# Patient Record
Sex: FEMALE | Race: WHITE
[De-identification: names, ages, dates, MRNs, and addresses within clinical notes are randomized per-mention and may not be internally consistent; named-entity substitution may affect disease eponyms.]

---

## 2018-11-25 ENCOUNTER — HOSPITAL ENCOUNTER (EMERGENCY)
Age: 33
Discharge: HOME | End: 2018-11-25

## 2018-11-25 ENCOUNTER — HOSPITAL ENCOUNTER (EMERGENCY)
Dept: HOSPITAL 91 - FTE | Age: 33
Discharge: HOME | End: 2018-11-25
Payer: COMMERCIAL

## 2018-11-25 DIAGNOSIS — R10.2: ICD-10-CM

## 2018-11-25 DIAGNOSIS — Z3A.10: ICD-10-CM

## 2018-11-25 DIAGNOSIS — O26.891: Primary | ICD-10-CM

## 2018-11-25 LAB
ADD MAN DIFF?: NO
ADD UMIC: NO
BASOPHIL #: 0.1 10^3/UL (ref 0–0.1)
BASOPHILS %: 0.5 % (ref 0–2)
EOSINOPHILS #: 0.2 10^3/UL (ref 0–0.5)
EOSINOPHILS %: 2.2 % (ref 0–7)
HEMATOCRIT: 40.5 % (ref 37–47)
HEMOGLOBIN: 13.5 G/DL (ref 12–16)
IMMATURE GRANS #M: 0.03 10^3/UL (ref 0–0.03)
IMMATURE GRANS % (M): 0.3 % (ref 0–0.43)
LYMPHOCYTES #: 2 10^3/UL (ref 0.8–2.9)
LYMPHOCYTES %: 20.1 % (ref 15–51)
MEAN CORPUSCULAR HEMOGLOBIN: 29.6 PG (ref 29–33)
MEAN CORPUSCULAR HGB CONC: 33.3 G/DL (ref 32–37)
MEAN CORPUSCULAR VOLUME: 88.8 FL (ref 82–101)
MEAN PLATELET VOLUME: 11.5 FL (ref 7.4–10.4)
MONOCYTE #: 0.7 10^3/UL (ref 0.3–0.9)
MONOCYTES %: 6.5 % (ref 0–11)
NEUTROPHIL #: 7.1 10^3/UL (ref 1.6–7.5)
NEUTROPHILS %: 70.4 % (ref 39–77)
NUCLEATED RED BLOOD CELLS #: 0 10^3/UL (ref 0–0)
NUCLEATED RED BLOOD CELLS%: 0 /100WBC (ref 0–0)
PLATELET COUNT: 272 10^3/UL (ref 140–415)
RED BLOOD COUNT: 4.56 10^6/UL (ref 4.2–5.4)
RED CELL DISTRIBUTION WIDTH: 12.1 % (ref 11.5–14.5)
UR ASCORBIC ACID: 40 MG/DL
UR BILIRUBIN (DIP): NEGATIVE MG/DL
UR BLOOD (DIP): NEGATIVE MG/DL
UR CLARITY: CLEAR
UR COLOR: YELLOW
UR GLUCOSE (DIP): NEGATIVE MG/DL
UR KETONES (DIP): (no result) MG/DL
UR LEUKOCYTE ESTERASE (DIP): NEGATIVE LEU/UL
UR NITRITE (DIP): NEGATIVE MG/DL
UR PH (DIP): 6 (ref 5–9)
UR SPECIFIC GRAVITY (DIP): 1.02 (ref 1–1.03)
UR TOTAL PROTEIN (DIP): NEGATIVE MG/DL
UR UROBILINOGEN (DIP): NEGATIVE MG/DL
WHITE BLOOD COUNT: 10 10^3/UL (ref 4.8–10.8)

## 2018-11-25 PROCEDURE — 81003 URINALYSIS AUTO W/O SCOPE: CPT

## 2018-11-25 PROCEDURE — 86900 BLOOD TYPING SEROLOGIC ABO: CPT

## 2018-11-25 PROCEDURE — 84702 CHORIONIC GONADOTROPIN TEST: CPT

## 2018-11-25 PROCEDURE — 96361 HYDRATE IV INFUSION ADD-ON: CPT

## 2018-11-25 PROCEDURE — 76801 OB US < 14 WKS SINGLE FETUS: CPT

## 2018-11-25 PROCEDURE — 86901 BLOOD TYPING SEROLOGIC RH(D): CPT

## 2018-11-25 PROCEDURE — 99285 EMERGENCY DEPT VISIT HI MDM: CPT

## 2018-11-25 PROCEDURE — 96374 THER/PROPH/DIAG INJ IV PUSH: CPT

## 2018-11-25 PROCEDURE — 76705 ECHO EXAM OF ABDOMEN: CPT

## 2018-11-25 PROCEDURE — 85025 COMPLETE CBC W/AUTO DIFF WBC: CPT

## 2018-11-25 PROCEDURE — 36415 COLL VENOUS BLD VENIPUNCTURE: CPT

## 2018-11-25 RX ADMIN — THIAMINE HYDROCHLORIDE 1 MLS/HR: 100 INJECTION, SOLUTION INTRAMUSCULAR; INTRAVENOUS at 14:52

## 2018-11-25 RX ADMIN — METOCLOPRAMIDE HYDROCHLORIDE 1 MG: 10 INJECTION, SOLUTION INTRAMUSCULAR; INTRAVENOUS at 17:03

## 2018-11-25 RX ADMIN — ACETAMINOPHEN 1 MG: 325 TABLET, FILM COATED ORAL at 14:51

## 2019-01-08 ENCOUNTER — HOSPITAL ENCOUNTER (EMERGENCY)
Dept: HOSPITAL 10 - FTE | Age: 34
Discharge: HOME | End: 2019-01-08
Payer: COMMERCIAL

## 2019-01-08 VITALS — RESPIRATION RATE: 18 BRPM | HEART RATE: 67 BPM | DIASTOLIC BLOOD PRESSURE: 53 MMHG | SYSTOLIC BLOOD PRESSURE: 103 MMHG

## 2019-01-08 VITALS — WEIGHT: 129.19 LBS | BODY MASS INDEX: 29.9 KG/M2 | HEIGHT: 55 IN

## 2019-01-08 DIAGNOSIS — O21.9: Primary | ICD-10-CM

## 2019-01-08 DIAGNOSIS — Z3A.16: ICD-10-CM

## 2019-01-08 PROCEDURE — 76805 OB US >/= 14 WKS SNGL FETUS: CPT

## 2019-01-08 PROCEDURE — 36415 COLL VENOUS BLD VENIPUNCTURE: CPT

## 2019-01-08 PROCEDURE — 96374 THER/PROPH/DIAG INJ IV PUSH: CPT

## 2019-01-08 PROCEDURE — 81001 URINALYSIS AUTO W/SCOPE: CPT

## 2019-01-08 PROCEDURE — 99285 EMERGENCY DEPT VISIT HI MDM: CPT

## 2019-01-08 PROCEDURE — 80053 COMPREHEN METABOLIC PANEL: CPT

## 2019-01-08 PROCEDURE — 85025 COMPLETE CBC W/AUTO DIFF WBC: CPT

## 2019-01-08 PROCEDURE — 83690 ASSAY OF LIPASE: CPT

## 2019-01-08 NOTE — ERD
ER Documentation


Chief Complaint


Chief Complaint





NAUSEA, VOMITING, SENT PER PMD FOR EVAL, 16 WKS PG, NO VB





HPI


33-year-old female presents with vomiting since early pregnancy.  She is 


approximately 16 weeks pregnant by dates.  She denies any bleeding.  She has 


intermittent crampy abdominal pain.  She denies any dysuria, fevers.  Vomit is 


nonbilious nonbloody.  She is a G1 para 0.  She was referred by primary doctor 


for evaluation for IV fluids.





ROS


All systems reviewed and are negative except as per history of present illness.





Medications


Home Meds


Active Scripts


Metoclopramide* (Reglan*) 10 Mg Tablet, 10 MG PO Q6 PRN for NAUSEA AND/OR 


VOMITING, #20 TAB


   Prov:JYOTI GONZALEZ MD         1/8/19


Metoclopramide* (Reglan*) 10 Mg Tablet, 10 MG PO Q6 PRN for NAUSEA AND/OR 


VOMITING, #10 TAB


   Prov:BERNICE HARPER PA-C         11/25/18


Acetaminophen* (Tylophen*) 500 Mg Capsule, 1 CAP PO Q6H PRN for PAIN AND OR 


ELEVATED TEMP, #20 CAP


   Prov:BERNICE HARPER PA-C         11/25/18





Allergies


Allergies:  


Coded Allergies:  


     No Known Allergy (Unverified , 11/25/18)





PMhx/Soc


Medical and Surgical Hx:  pt denies Medical Hx


History of Surgery:  Yes (Appendectomy)


Anesthesia Reaction:  No


Hx Alcohol Use:  No


Hx Substance Use:  No


Hx Tobacco Use:  No


Smoking Status:  Never smoker





FmHx


Family History:  No diabetes, No coronary disease, No other





Physical Exam


Vitals





Vital Signs


  Date      Temp  Pulse  Resp  B/P (MAP)   Pulse Ox  O2          O2 Flow    FiO2


Time                                                 Delivery    Rate


    1/8/19  98.0     79    16      110/55       100


     17:42                           (73)





Physical Exam


Const:   No acute distress


Head:   Atraumatic 


Eyes:    Normal Conjunctiva


ENT:    Normal External Ears, Nose and Mouth.


Neck:               Full range of motion. No meningismus.


Resp:   Clear to auscultation bilaterally


Cardio:   Regular rate and rhythm, no murmurs


Abd:    Soft, non tender, non distended. Normal bowel sounds


Skin:   No petechiae or rashes


Back:   No midline or flank tenderness


Ext:    No cyanosis, or edema


Neur:   Awake and alert


Psych:    Normal Mood and Affect


Result Diagram:  


1/8/19 1920 1/8/19 1920





Results 24 hrs





Laboratory Tests


      Test
                                  1/8/19
19:20     1/8/19
19:21


      White Blood Count                      8.9 10^3/ul


      Red Blood Count                       3.64 10^6/ul


      Hemoglobin                               11.1 g/dl


      Hematocrit                                  32.8 %


      Mean Corpuscular Volume                    90.1 fl


      Mean Corpuscular Hemoglobin                30.5 pg


      Mean Corpuscular Hemoglobin
Concent     33.8 g/dl 
  



      Red Cell Distribution Width                 12.9 %


      Platelet Count                         231 10^3/UL


      Mean Platelet Volume                       11.0 fl


      Immature Granulocytes %                    0.400 %


      Neutrophils %                               66.6 %


      Lymphocytes %                               21.8 %


      Monocytes %                                  6.8 %


      Eosinophils %                                4.1 %


      Basophils %                                  0.3 %


      Nucleated Red Blood Cells %            0.0 /100WBC


      Immature Granulocytes #              0.040 10^3/ul


      Neutrophils #                          5.9 10^3/ul


      Lymphocytes #                          2.0 10^3/ul


      Monocytes #                            0.6 10^3/ul


      Eosinophils #                          0.4 10^3/ul


      Basophils #                            0.0 10^3/ul


      Nucleated Red Blood Cells #            0.0 10^3/ul


      Sodium Level                            141 mmol/L


      Potassium Level                         3.2 mmol/L


      Chloride Level                          101 mmol/L


      Carbon Dioxide Level                     28 mmol/L


      Anion Gap                                       12


      Blood Urea Nitrogen                        7 mg/dl


      Creatinine                              0.46 mg/dl


      Est Glomerular Filtrat Rate
mL/min   > 60 mL/min 
   



      Glucose Level                            127 mg/dl


      Calcium Level                            9.4 mg/dl


      Total Bilirubin                          0.5 mg/dl


      Direct Bilirubin                        0.00 mg/dl


      Indirect Bilirubin                       0.5 mg/dl


      Aspartate Amino Transf
(AST/SGOT)         21 IU/L 
  



      Alanine Aminotransferase
(ALT/SGPT)       26 IU/L 
  



      Alkaline Phosphatase                       36 IU/L


      Total Protein                             7.0 g/dl


      Albumin                                   4.1 g/dl


      Globulin                                 2.90 g/dl


      Albumin/Globulin Ratio                        1.41


      Lipase                                      67 U/L


      Urine Color                                          YELLOW


      Urine Clarity
                       
               SLIGHTLY
CLOUDY


      Urine pH                                                        6.0


      Urine Specific Gravity                                        1.025


      Urine Ketones                                        TRACE mg/dL


      Urine Nitrite                                        NEGATIVE mg/dL


      Urine Bilirubin                                      NEGATIVE mg/dL


      Urine Urobilinogen                                   NEGATIVE mg/dL


      Urine Leukocyte Esterase
            
               NEGATIVE
Shamar/ul


      Urine Microscopic RBC                                        0 /HPF


      Urine Microscopic WBC                                        2 /HPF


      Urine Squamous Epithelial
Cells      
               MODERATE /HPF 



      Urine Amorphous Crystals                             FEW /HPF


      Urine Bacteria                                       FEW /HPF


      Urine Mucus                                          MODERATE /HPF


      Urine Hemoglobin                                     NEGATIVE mg/dL


      Urine Glucose                                              3+ mg/dL


      Urine Total Protein                                        1+ mg/dl





Current Medications


 Medications
   Dose
          Sig/Lorrie
       Start Time
   Status  Last


 (Trade)       Ordered        Route
 PRN     Stop Time              Admin
Dose


                              Reason                                Admin


 Sodium         2,000 ml @ 
   Q2H STAT
      1/8/19 1/8/19


Chloride       1,000 mls/hr   IV
            19:07
 1/8/19                19:31



                                             21:06


                10 mg          ONCE  ONCE
    1/8/19        DC            1/8/19


Metoclopramid                 IV
            19:30
 1/8/19                19:33



e HCl
                                       19:31


(Reglan)








Procedures/MDM


CBC shows minimal anemia.  Potassium is 3.2.  Urine shows no significant acute 


abnormal findings.  Patient was given 2 L normal saline IV IV, Reglan 10 mg IV. 


Patient had no vomiting during ER course.  She had a benign abdomen on serial 


exam.  Pelvic ultrasound shows second trimester intrauterine pregnancy without 


appreciable acute abnormalities.  Patient was given K-Dur 40 mg by mouth.  She 


presents with signs and symptoms of hyperemesis without evidence of significant 


dehydration, signs of complication of pregnancy, additional emergent causes of 


presenting complaints.  Will Reglan, recommendations for frequent small meals, 


fluids, primary care follow-up and return precautions for pain, bleeding, new or


worsening symptoms.  The patient was stable with no new complaints during the ER


course. Clinically, there is no current evidence to suggest meningitis, sepsis, 


acute abdomen, pneumonia, stroke,  acute coronary syndrome, pulmonary embolism, 


aortic dissection or any other emergent condition appearing to require further 


evaluation or hospitalization.  Patient counseled regarding my diagnostic 


impression and care plan. Prior to discharge all questions answered. Pt agrees 


with treatment plan and understands strict return precautions. Pt is instructed 


to follow up with primary care provider within 24-48 hours. Precautionary 


instructions provided including instructions to return to the ER if not 


improving or for any worsening or changing symptoms or concerns.





Departure


Diagnosis:  


   Primary Impression:  


   Nausea and vomiting


   Vomiting type:  unspecified  Vomiting Intractability:  unspecified  Qualified


   Codes:  R11.2 - Nausea with vomiting, unspecified


Condition:  Stable


Patient Instructions:  Hyperemesis Gravidarum (Severe Morning Sickness)


Referrals:  


DOCTOR,NOT ON STAFF (PCP)





Additional Instructions:  


[facility] NORMAL.  Cheque otro vez con pedroza doctor primario en el proximo 


mark or regresa para mas o nueva simptomas.











JYOTI GONZALEZ MD              Jan 8, 2019 20:05

## 2019-06-26 ENCOUNTER — HOSPITAL ENCOUNTER (INPATIENT)
Dept: HOSPITAL 91 - L-D | Age: 34
LOS: 4 days | Discharge: HOME | End: 2019-06-30
Payer: COMMERCIAL

## 2019-06-26 ENCOUNTER — HOSPITAL ENCOUNTER (INPATIENT)
Dept: HOSPITAL 10 - L-D | Age: 34
LOS: 4 days | Discharge: HOME | End: 2019-06-30
Attending: OBSTETRICS & GYNECOLOGY | Admitting: OBSTETRICS & GYNECOLOGY
Payer: COMMERCIAL

## 2019-06-26 VITALS
WEIGHT: 158.73 LBS | HEIGHT: 62 IN | BODY MASS INDEX: 29.21 KG/M2 | WEIGHT: 158.73 LBS | BODY MASS INDEX: 29.21 KG/M2 | HEIGHT: 62 IN

## 2019-06-26 VITALS — DIASTOLIC BLOOD PRESSURE: 65 MMHG | SYSTOLIC BLOOD PRESSURE: 109 MMHG | HEART RATE: 70 BPM | RESPIRATION RATE: 18 BRPM

## 2019-06-26 DIAGNOSIS — O36.63X0: ICD-10-CM

## 2019-06-26 DIAGNOSIS — O48.0: Primary | ICD-10-CM

## 2019-06-26 DIAGNOSIS — Z3A.40: ICD-10-CM

## 2019-06-26 LAB
ADD MAN DIFF?: NO
ADD UMIC: NO
BASOPHIL #: 0.1 10^3/UL (ref 0–0.1)
BASOPHILS %: 0.5 % (ref 0–2)
EOSINOPHILS #: 0.3 10^3/UL (ref 0–0.5)
EOSINOPHILS %: 2.5 % (ref 0–7)
HEMATOCRIT: 38 % (ref 37–47)
HEMOGLOBIN: 12.1 G/DL (ref 12–16)
HEPATITIS B SURFACE ANTIGEN: NEGATIVE
IMMATURE GRANS #M: 0.07 10^3/UL (ref 0–0.03)
IMMATURE GRANS % (M): 0.7 % (ref 0–0.43)
INR: 0.84
LYMPHOCYTES #: 1.6 10^3/UL (ref 0.8–2.9)
LYMPHOCYTES %: 15.2 % (ref 15–51)
MEAN CORPUSCULAR HEMOGLOBIN: 28.8 PG (ref 29–33)
MEAN CORPUSCULAR HGB CONC: 31.8 G/DL (ref 32–37)
MEAN CORPUSCULAR VOLUME: 90.5 FL (ref 82–101)
MEAN PLATELET VOLUME: 12.4 FL (ref 7.4–10.4)
MONOCYTE #: 0.9 10^3/UL (ref 0.3–0.9)
MONOCYTES %: 8.5 % (ref 0–11)
NEUTROPHIL #: 7.5 10^3/UL (ref 1.6–7.5)
NEUTROPHILS %: 72.6 % (ref 39–77)
NUCLEATED RED BLOOD CELLS #: 0 10^3/UL (ref 0–0)
NUCLEATED RED BLOOD CELLS%: 0 /100WBC (ref 0–0)
PARTIAL THROMBOPLASTIN TIME: 26.1 SEC (ref 23–35)
PLATELET COUNT: 187 10^3/UL (ref 140–415)
PROTIME: 11.6 SEC (ref 11.9–14.9)
PT RATIO: 0.9
RAPID PLASMA REAGIN: NONREACTIVE
RED BLOOD COUNT: 4.2 10^6/UL (ref 4.2–5.4)
RED CELL DISTRIBUTION WIDTH: 17.2 % (ref 11.5–14.5)
UR ASCORBIC ACID: NEGATIVE MG/DL
UR BILIRUBIN (DIP): NEGATIVE MG/DL
UR BLOOD (DIP): NEGATIVE MG/DL
UR CALCIUM OXALATE CRYSTAL: (no result) /HPF
UR CLARITY: (no result)
UR COLOR: YELLOW
UR GLUCOSE (DIP): NEGATIVE MG/DL
UR KETONES (DIP): (no result) MG/DL
UR LEUKOCYTE ESTERASE (DIP): NEGATIVE LEU/UL
UR MUCUS: (no result) /HPF
UR NITRITE (DIP): NEGATIVE MG/DL
UR PH (DIP): 5 (ref 5–9)
UR RBC: 1 /HPF (ref 0–5)
UR SPECIFIC GRAVITY (DIP): 1.02 (ref 1–1.03)
UR SQUAMOUS EPITHELIAL CELL: (no result) /HPF
UR TOTAL PROTEIN (DIP): NEGATIVE MG/DL
UR UROBILINOGEN (DIP): NEGATIVE MG/DL
UR WBC: 1 /HPF (ref 0–5)
WHITE BLOOD COUNT: 10.3 10^3/UL (ref 4.8–10.8)

## 2019-06-26 PROCEDURE — 86900 BLOOD TYPING SEROLOGIC ABO: CPT

## 2019-06-26 PROCEDURE — 62322 NJX INTERLAMINAR LMBR/SAC: CPT

## 2019-06-26 PROCEDURE — 86850 RBC ANTIBODY SCREEN: CPT

## 2019-06-26 PROCEDURE — 81003 URINALYSIS AUTO W/O SCOPE: CPT

## 2019-06-26 PROCEDURE — 87340 HEPATITIS B SURFACE AG IA: CPT

## 2019-06-26 PROCEDURE — 85610 PROTHROMBIN TIME: CPT

## 2019-06-26 PROCEDURE — 80048 BASIC METABOLIC PNL TOTAL CA: CPT

## 2019-06-26 PROCEDURE — 85730 THROMBOPLASTIN TIME PARTIAL: CPT

## 2019-06-26 PROCEDURE — 81001 URINALYSIS AUTO W/SCOPE: CPT

## 2019-06-26 PROCEDURE — 86901 BLOOD TYPING SEROLOGIC RH(D): CPT

## 2019-06-26 PROCEDURE — 85025 COMPLETE CBC W/AUTO DIFF WBC: CPT

## 2019-06-26 PROCEDURE — 86592 SYPHILIS TEST NON-TREP QUAL: CPT

## 2019-06-26 PROCEDURE — 76816 OB US FOLLOW-UP PER FETUS: CPT

## 2019-06-26 RX ADMIN — FENTANYL CIT 0.2 MG/100ML-ROPIV 0.2%-NACL 0.9% EPIDURAL INJ 1 ML: 2/0.2 SOLUTION at 18:35

## 2019-06-26 RX ADMIN — PYRIDOXINE HYDROCHLORIDE 1 MLS/HR: 100 INJECTION, SOLUTION INTRAMUSCULAR; INTRAVENOUS at 17:57

## 2019-06-26 RX ADMIN — Medication 1 MCG: at 10:45

## 2019-06-26 RX ADMIN — PYRIDOXINE HYDROCHLORIDE SCH MLS/HR: 100 INJECTION, SOLUTION INTRAMUSCULAR; INTRAVENOUS at 22:21

## 2019-06-26 RX ADMIN — Medication 1 MLS/HR: at 17:58

## 2019-06-26 RX ADMIN — PYRIDOXINE HYDROCHLORIDE 1 MLS/HR: 100 INJECTION, SOLUTION INTRAMUSCULAR; INTRAVENOUS at 17:01

## 2019-06-26 RX ADMIN — PYRIDOXINE HYDROCHLORIDE 1 MLS/HR: 100 INJECTION, SOLUTION INTRAMUSCULAR; INTRAVENOUS at 08:41

## 2019-06-26 RX ADMIN — PYRIDOXINE HYDROCHLORIDE 1 MLS/HR: 100 INJECTION, SOLUTION INTRAMUSCULAR; INTRAVENOUS at 12:49

## 2019-06-26 RX ADMIN — FENTANYL CIT 0.2 MG/100ML-ROPIV 0.2%-NACL 0.9% EPIDURAL INJ SCH ML: 2/0.2 SOLUTION at 18:35

## 2019-06-26 RX ADMIN — PYRIDOXINE HYDROCHLORIDE PRN MLS/HR: 100 INJECTION, SOLUTION INTRAMUSCULAR; INTRAVENOUS at 17:57

## 2019-06-26 RX ADMIN — PYRIDOXINE HYDROCHLORIDE SCH MLS/HR: 100 INJECTION, SOLUTION INTRAMUSCULAR; INTRAVENOUS at 12:49

## 2019-06-26 RX ADMIN — PYRIDOXINE HYDROCHLORIDE PRN MLS/HR: 100 INJECTION, SOLUTION INTRAMUSCULAR; INTRAVENOUS at 17:01

## 2019-06-26 RX ADMIN — PYRIDOXINE HYDROCHLORIDE 1 MLS/HR: 100 INJECTION, SOLUTION INTRAMUSCULAR; INTRAVENOUS at 22:21

## 2019-06-26 RX ADMIN — Medication SCH MLS/HR: at 17:58

## 2019-06-26 RX ADMIN — PYRIDOXINE HYDROCHLORIDE SCH MLS/HR: 100 INJECTION, SOLUTION INTRAMUSCULAR; INTRAVENOUS at 08:41

## 2019-06-26 NOTE — PAC
Date/Time of Note


Date/Time of Note


DATE: 6/26/19 


TIME: 19:36





Post-Anesthesia Notes


Post-Anesthesia Note


Last documented vital signs





Vital Signs


  Date      Temp  Pulse  Resp  B/P (MAP)   Pulse Ox  O2          O2 Flow    FiO2


Time                                                 Delivery    Rate


   6/26/19  97.8     70    18      109/65       100


     19:16                           (80)





Activity:  WNL


Respiratory function:  WNL


Cardiovascular function:  WNL


Mental status:  Baseline


Pain reasonably controlled:  Yes


Hydration appropriate:  Yes


Nausea/Vomiting absent:  Yes











HOPE VELOZ MD              Jun 26, 2019 19:37

## 2019-06-26 NOTE — PREAC
Date/Time of Note


Date/Time of Note


DATE: 19 


TIME: 17:57





Anesthesia Eval and Record


Evaluation


Time Pre-Procedure Interview


DATE: 19 


TIME: 17:57


Age


34


Sex


female


NPO:  8 hrs


Preoperative diagnosis


Labor Pain


Planned procedure


Labor Epidural





Past Medical History


Past Medical History:  Includes


Pregnancy:  : (1), Para: (0), Gestational age: (40)





Surgery & Anesthesia Issues


No known issue





Meds


Anticoagulation:  No


Beta Blocker within 24 hr:  No


Reason Beta Blocker not given:  Pt. not on B-Blocker


Active Scripts


Metoclopramide* (Reglan*) 10 Mg Tablet, 10 MG PO Q6 PRN for NAUSEA AND/OR 


VOMITING, #20 TAB


   Prov:JYOTI GONZALEZ MD         19


Metoclopramide* (Reglan*) 10 Mg Tablet, 10 MG PO Q6 PRN for NAUSEA AND/OR 


VOMITING, #10 TAB


   Prov:BERNICE HARPER PA-C         18


Acetaminophen* (Tylophen*) 500 Mg Capsule, 1 CAP PO Q6H PRN for PAIN AND OR 


ELEVATED TEMP, #20 CAP


   Prov:BERNICE HARPER PA-C         18





Current Medications


Lactated Ringer's 1,000 ml @  125 mls/hr Q8H IV  Last administered on 19at 


12:49; Admin Dose 125 MLS/HR;  Start 19 at 07:30


Butorphanol Tartrate (Stadol) 2 mg Q2H  PRN IV .PAIN SCALE 6-10;  Start 19 


at 08:30


Lidocaine (Xylocaine 1% (Mpf)) 30 ml ONCE PRN INJ .EPISIOTOMY;  Start 19 at


08:30


Oxytocin/Lactated Ringer's 500 ml @  500 mls/hr ONCE POST PARTUM IV ;  Start 


19 at 08:30


Oxytocin/Lactated Ringer's 500 ml @  125 mls/hr POST PARTUM IV ;  Start 19 


at 08:30


Ibuprofen (Motrin) 600 mg ONCE PRN PO .PAIN 1-5;  Start 19 at 08:30


Oxycodone/Aspirin (Percodan) 2 tab ONCE PRN PO .PAIN 6-10;  Start 19 at 


08:30


Lactated Ringer's 1,000 ml @  2,000 mls/hr Q30M PRN IV .ANESTHESIA Last 


administered on 19at 17:01; Admin Dose 2,000 MLS/HR;  Start 19 at 


08:07


Oxytocin/Lactated Ringer's 500 ml @ 0 mls/hr ONCE PRN IV .VAGINAL BLEEDING;  


Start 19 at 08:30


Methylergonovine Maleate (Methergine) 0.2 mg ONCE PRN IM .VAGINAL BLEEDING;  


Start 19 at 08:30


Carboprost Tromethamine (Hemabate) 250 mcg ONCE PRN IM .VAGINAL BLEEDING;  Start


19 at 08:30


Misoprostol (Cytotec) 1,000 mcg ONCE PRN [place] .VAGINAL BLEEDING;  Start 19 at


08:30


Oxytocin/Lactated Ringer's 500 ml @ 0 mls/hr Q0M IV ;  Start 19 at 17:00


Meds reviewed:  Yes





Allergies


Coded Allergies:  


     No Known Allergy (Unverified , 18)


Allergies Reviewed:  Yes





Labs/Studies


Labs Reviewed:  Reviewed by anesthesiologist


Result Diagram:  


19 0830





Laboratory Tests


19 08:30











Blood Bank


                  Test
                         19
08:30


                  Antibody Screen               NEGATIVE


                  Blood Type                    O POSITIVE


                  Rh Immune Globulin Candidate  NO





Pregnancy test:  Positive


Studies:  ECG (n/a), CXR (n/a)





Pre-procedure Exam


Last vitals





Vital Signs


  Date      Temp  Pulse  Resp  B/P (MAP)   Pulse Ox  O2          O2 Flow    FiO2


Time                                                 Delivery    Rate


   19  97.8     70    18      109/65


     08:16                           (80)





Airway:  Adequate mouth opening, Adequate thyromental dist


Mallampati:  Mallampati II


Teeth:  Normal


Lung:  Normal


Heart:  Normal





ASA Physical Status


ASA physical status:  2


Emergency:  None





Planned Anesthetic


Neuraxial:  Epidural





Planned Pain Management


Epidural





Pre-operative Attestations


Prior to commencing anesthesia and surgery, the patient was re-evaluated, there 


was verification of:


*The patient's identity


*The results of appropriate recent lab work and preoperative vital signs


*The above evaluation not changing prior to induction


*Anesthetic plan, risk benefits, alternative and complications discussed with 


patient/family; questions answered; patient/family understands, accepts and 


wishes to proceed.











HOPE VELOZ MD 26, 2019 17:58

## 2019-06-26 NOTE — PREOPHP
DATE OF ADMISSION: 2019

 

HISTORY OF PRESENT ILLNESS:  This is a 34-year-old lady,  1, EDC 

2019 at 40 weeks pregnant, admitted to labor and delivery area for 

induction.  She had prenatal care in my [place] office and the prenatal care was

uneventful.

 

PAST PERSONAL HISTORY:  No history of diabetes, TB or asthma.

 

ALLERGIES:  NO ALLERGIES.

 

SOCIAL HISTORY:  The patient does not smoke.  She does not drink.

 

MEDICATIONS:  She does not take any drugs except her iron and vitamins.

 

GYNECOLOGIC HISTORY:  She had menarche at the age of 13, every 28 days interval,

3 to 4 days duration, and moderate in amount.

 

FAMILY HISTORY:  Father has diabetes.

 

REVIEW OF SYSTEMS:

CARDIOVASCULAR:  No chest pains.

RESPIRATORY:  No cough.

GASTROINTESTINAL:  No diarrhea, no vomiting.

GENITOURINARY:  No dysuria.

 

PHYSICAL EXAMINATION:

GENERAL:  Reveals a conscious, coherent lady and in no acute distress.

VITAL SIGNS:  Her blood pressure 120/80, pulse rate 80 per minute, respirations 

16 per minute.

BREASTS:  Within normal limits.

HEART:  Within normal limits.

LUNGS:  Within normal limits.

UTERUS:  Fundic height 37 cm.  Fetal heart tones 140 per minute.

PELVIC:  Exam at 10:35 a.m. done by me revealed the cervix to be station -2 in 

cephalic presentation with the bag of water intact.

EXTREMITIES:  No pedal edema.

 

ADMITTING DIAGNOSIS:  A 40 weeks intrauterine pregnancy.  The plans of delivery 

were explained to the patient as to go for vaginal delivery.  The risks, 

benefits, and alternatives to vaginal delivery at  were explained.  The

risks of  were explained as well.  She had an OB ultrasound and the 

estimated fetal weight is about 7 pounds 5 ounces.  The patient wanted to go for

and her  wanted to go for a vaginal delivery.  The patient was planned to

have Cytotec.  The patient so far, at this time of dictation, received 1 dose of

Cytotec.  She was having some contractions and at times, she was having 

tachysystole and she was evaluated by the nurse and she was 1 cm so the Cytotec 

was ordered not to be given anymore and to be given Pitocin augmentation.

 

 

Dictated By: WILL NAJERA/NTS

DD:    2019 18:41:59

DT:    2019 20:25:21

Conf#: [id number]

DID#:  [id number]

 

MTDD

## 2019-06-27 RX ADMIN — PYRIDOXINE HYDROCHLORIDE 1 MLS/HR: 100 INJECTION, SOLUTION INTRAMUSCULAR; INTRAVENOUS at 21:49

## 2019-06-27 RX ADMIN — FENTANYL CIT 0.2 MG/100ML-ROPIV 0.2%-NACL 0.9% EPIDURAL INJ 1 ML: 2/0.2 SOLUTION at 00:52

## 2019-06-27 RX ADMIN — PYRIDOXINE HYDROCHLORIDE 1 MLS/HR: 100 INJECTION, SOLUTION INTRAMUSCULAR; INTRAVENOUS at 14:29

## 2019-06-27 RX ADMIN — ONDANSETRON HYDROCHLORIDE 1 MG: 2 INJECTION, SOLUTION INTRAMUSCULAR; INTRAVENOUS at 18:38

## 2019-06-27 RX ADMIN — Medication 1 MLS/HR: at 21:54

## 2019-06-27 RX ADMIN — PYRIDOXINE HYDROCHLORIDE 1 MLS/HR: 100 INJECTION, SOLUTION INTRAMUSCULAR; INTRAVENOUS at 23:30

## 2019-06-27 RX ADMIN — FENTANYL CIT 0.2 MG/100ML-ROPIV 0.2%-NACL 0.9% EPIDURAL INJ SCH ML: 2/0.2 SOLUTION at 14:57

## 2019-06-27 RX ADMIN — FENTANYL CIT 0.2 MG/100ML-ROPIV 0.2%-NACL 0.9% EPIDURAL INJ 1 ML: 2/0.2 SOLUTION at 14:57

## 2019-06-27 RX ADMIN — Medication 1 MLS/HR: at 13:00

## 2019-06-27 RX ADMIN — Medication SCH MLS/HR: at 13:00

## 2019-06-27 RX ADMIN — FENTANYL CIT 0.2 MG/100ML-ROPIV 0.2%-NACL 0.9% EPIDURAL INJ 1 ML: 2/0.2 SOLUTION at 08:38

## 2019-06-27 RX ADMIN — FENTANYL CIT 0.2 MG/100ML-ROPIV 0.2%-NACL 0.9% EPIDURAL INJ SCH ML: 2/0.2 SOLUTION at 00:52

## 2019-06-27 RX ADMIN — PYRIDOXINE HYDROCHLORIDE SCH MLS/HR: 100 INJECTION, SOLUTION INTRAMUSCULAR; INTRAVENOUS at 06:03

## 2019-06-27 RX ADMIN — FENTANYL CIT 0.2 MG/100ML-ROPIV 0.2%-NACL 0.9% EPIDURAL INJ SCH ML: 2/0.2 SOLUTION at 08:38

## 2019-06-27 RX ADMIN — PYRIDOXINE HYDROCHLORIDE SCH MLS/HR: 100 INJECTION, SOLUTION INTRAMUSCULAR; INTRAVENOUS at 23:30

## 2019-06-27 RX ADMIN — PYRIDOXINE HYDROCHLORIDE SCH MLS/HR: 100 INJECTION, SOLUTION INTRAMUSCULAR; INTRAVENOUS at 14:29

## 2019-06-27 RX ADMIN — Medication 1 MLS/HR: at 21:56

## 2019-06-27 RX ADMIN — PYRIDOXINE HYDROCHLORIDE 1 MLS/HR: 100 INJECTION, SOLUTION INTRAMUSCULAR; INTRAVENOUS at 06:03

## 2019-06-27 NOTE — PREAC
Date/Time of Note


Date/Time of Note


DATE: 6/27/19 


TIME: 20:29





Anesthesia Eval and Record


Evaluation


Time Pre-Procedure Interview


DATE: 6/27/19 


TIME: 20:29


Age


34


Sex


female


NPO:  8 hrs


Preoperative diagnosis


failure to progress


Planned procedure


c section





Past Medical History


Past Medical History:  Includes


Pregnancy:  Gestational age:





Surgery & Anesthesia Issues


No known issue (40.1)





Meds


Anticoagulation:  No


Beta Blocker within 24 hr:  No


Reason Beta Blocker not given:  Pt. not on B-Blocker


Active Scripts


Metoclopramide* (Reglan*) 10 Mg Tablet, 10 MG PO Q6 PRN for NAUSEA AND/OR 


VOMITING, #20 TAB


   Prov:JYOTI GONZALEZ MD         1/8/19


Metoclopramide* (Reglan*) 10 Mg Tablet, 10 MG PO Q6 PRN for NAUSEA AND/OR V


OMITING, #10 TAB


   Prov:BERNICE HARPER PA-C         11/25/18


Acetaminophen* (Tylophen*) 500 Mg Capsule, 1 CAP PO Q6H PRN for PAIN AND OR 


ELEVATED TEMP, #20 CAP


   Prov:BERNICE HARPER PA-C         11/25/18





Current Medications


Lactated Ringer's 1,000 ml @  125 mls/hr Q8H IV  Last administered on 6/27/19at 


14:29; Admin Dose 125 MLS/HR;  Start 6/26/19 at 07:30


Butorphanol Tartrate (Stadol) 2 mg Q2H  PRN IV .PAIN SCALE 6-10;  Start 6/26/19 


at 08:30


Lidocaine (Xylocaine 1% (Mpf)) 30 ml ONCE PRN INJ .EPISIOTOMY;  Start 6/26/19 at


08:30


Oxytocin/Lactated Ringer's 500 ml @  500 mls/hr ONCE POST PARTUM IV ;  Start 


6/26/19 at 08:30


Oxytocin/Lactated Ringer's 500 ml @  125 mls/hr POST PARTUM IV ;  Start 6/26/19 


at 08:30


Ibuprofen (Motrin) 600 mg ONCE PRN PO .PAIN 1-5;  Start 6/26/19 at 08:30


Oxycodone/Aspirin (Percodan) 2 tab ONCE PRN PO .PAIN 6-10;  Start 6/26/19 at 


08:30


Lactated Ringer's 1,000 ml @  2,000 mls/hr Q30M PRN IV .ANESTHESIA Last 


administered on 6/26/19at 17:57; Admin Dose 2,000 MLS/HR;  Start 6/26/19 at 


08:07


Oxytocin/Lactated Ringer's 500 ml @ 0 mls/hr ONCE PRN IV .VAGINAL BLEEDING;  


Start 6/26/19 at 08:30


Methylergonovine Maleate (Methergine) 0.2 mg ONCE PRN IM .VAGINAL BLEEDING;  


Start 6/26/19 at 08:30


Carboprost Tromethamine (Hemabate) 250 mcg ONCE PRN IM .VAGINAL BLEEDING;  Start


6/26/19 at 08:30


Misoprostol (Cytotec) 1,000 mcg ONCE PRN [place] .VAGINAL BLEEDING;  Start 6/26/19 at


08:30


Oxytocin/Lactated Ringer's 500 ml @ 0 mls/hr Q0M IV  Last administered on 


6/27/19at 13:00; Admin Dose 2 MLS/HR;  Start 6/26/19 at 17:00


Naloxone HCl (Narcan) 0.2 mg Q2M  PRN IV .RESP RATE;  Start 6/26/19 at 18:00


Fentanyl/ Ropivacaine 100 ml EPIDURAL (PCEA) EPI  Last administered on 6/27/19at


14:57; Admin Dose 100 ML;  Start 6/26/19 at 18:00


Ondansetron HCl (Zofran Inj) 4 mg Q6H  PRN IV NAUSEA AND/OR VOMITING Last 


administered on 6/27/19at 18:38; Admin Dose 4 MG;  Start 6/27/19 at 18:30


Cefazolin Sodium/ Dextrose 50 ml @  100 mls/hr ONCE IVPB ;  Start 6/27/19 at 


20:30


Meds reviewed:  Yes





Allergies


Coded Allergies:  


     No Known Allergy (Unverified , 11/25/18)


Allergies Reviewed:  Yes





Labs/Studies


Labs Reviewed:  Reviewed by anesthesiologist


Result Diagram:  


6/26/19 0830





Pregnancy test:  Positive


Studies:  ECG (n/a), CXR (n/a)





Pre-procedure Exam


Last vitals





Vital Signs


  Date      Temp  Pulse  Resp  B/P (MAP)   Pulse Ox  O2          O2 Flow    FiO2


Time                                                 Delivery    Rate


   6/26/19  97.8     70    18      109/65


     08:16                           (80)





Airway:  Adequate mouth opening


Mallampati:  Mallampati I


Teeth:  Normal


Lung:  Normal


Heart:  Normal





ASA Physical Status


ASA physical status:  2


Emergency:  None





Planned Anesthetic


Neuraxial:  Epidural





Planned Pain Management


Parenteral pain med





Pre-operative Attestations


Prior to commencing anesthesia and surgery, the patient was re-evaluated, there 


was verification of:


*The patient's identity


*The results of appropriate recent lab work and preoperative vital signs


*The above evaluation not changing prior to induction


*Anesthetic plan, risk benefits, alternative and complications discussed with 


patient/family; questions answered; patient/family understands, accepts and 


wishes to proceed.











PRUDENCIO KLINE MD            Jun 27, 2019 20:30

## 2019-06-27 NOTE — HP
DATE OF ADMISSION: 2019

 

ADDENDUM

 

HISTORY OF PRESENT ILLNESS:  See dictated history and physical.

 

ADMITTING DIAGNOSIS:  A 40 weeks intrauterine pregnancy in early labor.

 

PROGRESS OF LABOR:  This patient received Cytotec x1, and then with a Cytotec, she was having some ta
chysystole, and because of that, she was only given 1 dose of Cytotec and then she was started on Pit
ocin and then the Pitocin was started at 1740 p.m. on 2019 and then she was having moderate con
tractions, and around 8:30 in the morning, she had artificial ruptured bag of water and she was 3 cm 
dilated and done by Dr. Murray and she was on Pitocin from 7:34 p.m. on , and and restarted
 at 12:46 p.m.  She had, as mentioned, artificial rupture of membranes, by Dr. Murray at 8:40 a.m. o
n .  She was continued on Pitocin augmentation at 7:40 p.m. on .  I reevaluated the
 patient.  She is having good contractions since is still 3 cm dilated and 100% effaced and station s
till -2, and because of failure to progress, the patient and the  was informed that she should
 go for  for suspected macrosomia as well as failure to progress.  The procedures were expla
ined to the patient and to her  and both understood everything totally.  The risks, benefits, 
and alternatives were discussed with them as well.

 

 

Dictated By: WILL NAJERA/AMANDA

DD:    2019 20:05:06

DT:    2019 20:37:03

Conf#: [id number]

DID#:  [id number]

## 2019-06-27 NOTE — OPPN
Date/Time of Note


Date/Time of Note


DATE: 6/27/19 


TIME: 21:45





Operative Report


Planned Procedure


Procedure date


Jun 27, 2019


Procedure(s)


PRIMARY CSECTION


Performed by


see signature line


Assistant:  MAIKEL STANLEY MD


2nd Assistant


none


Pre-procedure diagnosis


40 1/7 IUP FAILURE TO PROGRESS


                [id number]
Anesthesia Type:  Gapth1g
epidural








Post-Procedure


Post-procedure diagnosis


40 1/7 IUP FAILURE TO PROGRESS


Findings


Live Baby GIRL, Apgars 9and 9, weight 6LBS 10OZ


Estimated Blood Loss:  600 - 700 mls


Specimen(s)


none


Grafts/Implant(s)


PLACENTA


Complication(s)


none











WILL PRITCHETT MD             Jun 27, 2019 21:49

## 2019-06-28 VITALS — SYSTOLIC BLOOD PRESSURE: 97 MMHG | HEART RATE: 80 BPM | RESPIRATION RATE: 18 BRPM | DIASTOLIC BLOOD PRESSURE: 55 MMHG

## 2019-06-28 VITALS — SYSTOLIC BLOOD PRESSURE: 113 MMHG | HEART RATE: 83 BPM | DIASTOLIC BLOOD PRESSURE: 57 MMHG | RESPIRATION RATE: 18 BRPM

## 2019-06-28 VITALS — DIASTOLIC BLOOD PRESSURE: 64 MMHG | RESPIRATION RATE: 18 BRPM | SYSTOLIC BLOOD PRESSURE: 130 MMHG | HEART RATE: 84 BPM

## 2019-06-28 VITALS — DIASTOLIC BLOOD PRESSURE: 59 MMHG | SYSTOLIC BLOOD PRESSURE: 129 MMHG | RESPIRATION RATE: 18 BRPM | HEART RATE: 90 BPM

## 2019-06-28 VITALS — HEART RATE: 82 BPM | SYSTOLIC BLOOD PRESSURE: 119 MMHG | RESPIRATION RATE: 18 BRPM | DIASTOLIC BLOOD PRESSURE: 72 MMHG

## 2019-06-28 VITALS — RESPIRATION RATE: 18 BRPM | SYSTOLIC BLOOD PRESSURE: 106 MMHG | DIASTOLIC BLOOD PRESSURE: 55 MMHG | HEART RATE: 84 BPM

## 2019-06-28 VITALS — SYSTOLIC BLOOD PRESSURE: 115 MMHG | RESPIRATION RATE: 19 BRPM | DIASTOLIC BLOOD PRESSURE: 72 MMHG | HEART RATE: 77 BPM

## 2019-06-28 LAB
ABNORMAL IP MESSAGE: 1
ADD MAN DIFF?: NO
ANION GAP: 7 (ref 5–13)
BASOPHIL #: 0 10^3/UL (ref 0–0.1)
BASOPHILS %: 0.2 % (ref 0–2)
BLOOD UREA NITROGEN: 7 MG/DL (ref 7–20)
CALCIUM: 8.6 MG/DL (ref 8.4–10.2)
CARBON DIOXIDE: 21 MMOL/L (ref 21–31)
CHLORIDE: 109 MMOL/L (ref 97–110)
CREATININE: 0.54 MG/DL (ref 0.44–1)
EOSINOPHILS #: 0.1 10^3/UL (ref 0–0.5)
EOSINOPHILS %: 0.7 % (ref 0–7)
GLUCOSE: 73 MG/DL (ref 70–220)
HEMATOCRIT: 32.6 % (ref 37–47)
HEMOGLOBIN: 10.7 G/DL (ref 12–16)
IMMATURE GRANS #M: 0.08 10^3/UL (ref 0–0.03)
IMMATURE GRANS % (M): 0.5 % (ref 0–0.43)
LYMPHOCYTES #: 1.2 10^3/UL (ref 0.8–2.9)
LYMPHOCYTES %: 7.2 % (ref 15–51)
MEAN CORPUSCULAR HEMOGLOBIN: 29.2 PG (ref 29–33)
MEAN CORPUSCULAR HGB CONC: 32.8 G/DL (ref 32–37)
MEAN CORPUSCULAR VOLUME: 88.8 FL (ref 82–101)
MEAN PLATELET VOLUME: 11.9 FL (ref 7.4–10.4)
MONOCYTE #: 1.8 10^3/UL (ref 0.3–0.9)
MONOCYTES %: 11.4 % (ref 0–11)
NEUTROPHIL #: 12.8 10^3/UL (ref 1.6–7.5)
NEUTROPHILS %: 80 % (ref 39–77)
NUCLEATED RED BLOOD CELLS #: 0 10^3/UL (ref 0–0)
NUCLEATED RED BLOOD CELLS%: 0 /100WBC (ref 0–0)
PLATELET COUNT: 163 10^3/UL (ref 140–415)
POSITIVE DIFF: (no result)
POTASSIUM: 3.7 MMOL/L (ref 3.5–5.1)
RED BLOOD COUNT: 3.67 10^6/UL (ref 4.2–5.4)
RED CELL DISTRIBUTION WIDTH: 17.1 % (ref 11.5–14.5)
SODIUM: 137 MMOL/L (ref 135–144)
WHITE BLOOD COUNT: 16 10^3/UL (ref 4.8–10.8)

## 2019-06-28 RX ADMIN — DOCUSATE SODIUM AND SENNOSIDES 1 TAB: 8.6; 5 TABLET, FILM COATED ORAL at 08:50

## 2019-06-28 RX ADMIN — PYRIDOXINE HYDROCHLORIDE SCH MLS/HR: 100 INJECTION, SOLUTION INTRAMUSCULAR; INTRAVENOUS at 11:21

## 2019-06-28 RX ADMIN — KETOROLAC TROMETHAMINE PRN MG: 30 INJECTION, SOLUTION INTRAMUSCULAR at 12:15

## 2019-06-28 RX ADMIN — Medication 1 APPLIC: at 08:50

## 2019-06-28 RX ADMIN — KETOROLAC TROMETHAMINE 1 MG: 30 INJECTION, SOLUTION INTRAMUSCULAR at 12:15

## 2019-06-28 RX ADMIN — Medication 1 MLS/HR: at 02:09

## 2019-06-28 RX ADMIN — HYDROCODONE BITARTRATE AND ACETAMINOPHEN 1 TAB: 5; 325 TABLET ORAL at 23:50

## 2019-06-28 RX ADMIN — DOCUSATE SODIUM AND SENNOSIDES SCH TAB: 8.6; 5 TABLET, FILM COATED ORAL at 21:21

## 2019-06-28 RX ADMIN — DOCUSATE SODIUM AND SENNOSIDES SCH TAB: 8.6; 5 TABLET, FILM COATED ORAL at 08:50

## 2019-06-28 RX ADMIN — DOCUSATE SODIUM AND SENNOSIDES 1 TAB: 8.6; 5 TABLET, FILM COATED ORAL at 21:21

## 2019-06-28 RX ADMIN — KETOROLAC TROMETHAMINE 1 MG: 30 INJECTION, SOLUTION INTRAMUSCULAR at 20:12

## 2019-06-28 RX ADMIN — KETOROLAC TROMETHAMINE PRN MG: 30 INJECTION, SOLUTION INTRAMUSCULAR at 20:12

## 2019-06-28 RX ADMIN — HYDROCODONE BITARTRATE AND ACETAMINOPHEN PRN TAB: 5; 325 TABLET ORAL at 23:50

## 2019-06-28 RX ADMIN — PYRIDOXINE HYDROCHLORIDE 1 MLS/HR: 100 INJECTION, SOLUTION INTRAMUSCULAR; INTRAVENOUS at 11:21

## 2019-06-28 NOTE — PN
Date/Time of Note


Date/Time of Note


DATE: 6/28/19 


TIME: 15:06





Assessment/Plan


VTE Prophylaxis


Risk score (from Ns)>0 risk:  3


SCD applied (from Ns):  Yes


Pharmacological prophylaxis:  NA/contraindicated


Pharm contraindication:  low risk/ambulating





Lines/Catheters


IV Catheter Type (from Nrs):  Peripheral IV





Assessment/Plan


Assessment/Plan


POSTCSECTION DAY 1


AS ORDERED


ADVANCE DIET AS TOLERATED


CBC ON 3RD POSTOP DAY


Result Diagram:  


6/28/19 0755                                                                    


           6/28/19 0755





Results 24hrs





Laboratory Tests


               Test
                                6/28/19
07:55


               White Blood Count                         16.0  #H


               Red Blood Count                            3.67  L


               Hemoglobin                                 10.7  L


               Hematocrit                                 32.6  L


               Mean Corpuscular Volume                     88.8


               Mean Corpuscular Hemoglobin                 29.2


               Mean Corpuscular Hemoglobin
Concent        32.8  



               Red Cell Distribution Width                17.1  H


               Platelet Count                               163


               Mean Platelet Volume                       11.9  H


               Immature Granulocytes %                   0.500  H


               Neutrophils %                              80.0  H


               Lymphocytes %                               7.2  L


               Monocytes %                                11.4  H


               Eosinophils %                                0.7


               Basophils %                                  0.2


               Nucleated Red Blood Cells %                  0.0


               Immature Granulocytes #                   0.080  H


               Neutrophils #                              12.8  H


               Lymphocytes #                                1.2


               Monocytes #                                 1.8  H


               Eosinophils #                                0.1


               Basophils #                                  0.0


               Nucleated Red Blood Cells #                  0.0


               Sodium Level                                 137


               Potassium Level                              3.7


               Chloride Level                               109


               Carbon Dioxide Level                          21


               Anion Gap                                      7


               Blood Urea Nitrogen                            7


               Creatinine                                  0.54


               Est Glomerular Filtrat Rate
mL/min   > 60  



               Glucose Level                                 73


               Calcium Level                                8.6








Subjective


24 Hr Interval Summary


Free Text/Dictation


POST CSECTION DAY 1


COMPLAIN OF INCISIONAL PAINS


GOOD URINE OUTPUT


PASSING GAS PER RECTUM 


NO BOWEL MOVEMENT YET





Exam/Review of Systems


Exam


Vitals





Vital Signs


  Date      Temp  Pulse  Resp  B/P (MAP)   Pulse Ox  O2          O2 Flow    FiO2


Time                                                 Delivery    Rate


   6/28/19  98.2     84    18      106/55        98  Room Air


     08:00                           (72)








Intake and Output





6/27/19 6/27/19 6/28/19





1515:00


23:00


07:00





IntakeIntake Total


1042 ml


816 ml


400 ml





OutputOutput Total


1200 ml


1100 ml


2429 ml





BalanceBalance


-158 ml


-284 ml


-2029 ml











Exam


VITAL SIGNS STABLE: YES


AFEBRILE: YES


BREAST NOT ENGORGED, NON-TENDER, NO APPRECIABLE MASS: YES


LUNGS CLEAR, NO RALES, WHEEZES, RHONCHI: YES


SINUS RHYTHM WITHOUT MURMUR: YES


ABDOMEN: NON-TENDER


FUNDUS: BELOW UMBILICUS


BOWEL SOUNDS: PRESENT


UTERUS: FIRM


INCISION (CLEAN, DRY, AND INTACT): YES


LOCHIA: LIGHT


DEEP TENDON REFLEXES: 0


EXTREMITIES: NO CALF TENDERNESS


EDEMA SCALE: NONE





Results


Results 24hrs





Laboratory Tests


               Test
                                6/28/19
07:55


               White Blood Count                         16.0  #H


               Red Blood Count                            3.67  L


               Hemoglobin                                 10.7  L


               Hematocrit                                 32.6  L


               Mean Corpuscular Volume                     88.8


               Mean Corpuscular Hemoglobin                 29.2


               Mean Corpuscular Hemoglobin
Concent        32.8  



               Red Cell Distribution Width                17.1  H


               Platelet Count                               163


               Mean Platelet Volume                       11.9  H


               Immature Granulocytes %                   0.500  H


               Neutrophils %                              80.0  H


               Lymphocytes %                               7.2  L


               Monocytes %                                11.4  H


               Eosinophils %                                0.7


               Basophils %                                  0.2


               Nucleated Red Blood Cells %                  0.0


               Immature Granulocytes #                   0.080  H


               Neutrophils #                              12.8  H


               Lymphocytes #                                1.2


               Monocytes #                                 1.8  H


               Eosinophils #                                0.1


               Basophils #                                  0.0


               Nucleated Red Blood Cells #                  0.0


               Sodium Level                                 137


               Potassium Level                              3.7


               Chloride Level                               109


               Carbon Dioxide Level                          21


               Anion Gap                                      7


               Blood Urea Nitrogen                            7


               Creatinine                                  0.54


               Est Glomerular Filtrat Rate
mL/min   > 60  



               Glucose Level                                 73


               Calcium Level                                8.6








Medications


Medication





Current Medications


Lactated Ringer's 1,000 ml @  125 mls/hr Q8H IV  Last administered on 6/28/19at 


11:21; Admin Dose 125 MLS/HR;  Start 6/26/19 at 07:30


Butorphanol Tartrate (Stadol) 2 mg Q2H  PRN IV .PAIN SCALE 6-10;  Start 6/26/19 


at 08:30


Lidocaine (Xylocaine 1% (Mpf)) 30 ml ONCE PRN INJ .EPISIOTOMY;  Start 6/26/19 at


08:30


Oxytocin/Lactated Ringer's 500 ml @  500 mls/hr ONCE POST PARTUM IV ;  Start 


6/26/19 at 08:30


Oxytocin/Lactated Ringer's 500 ml @  125 mls/hr POST PARTUM IV  Last 


administered on 6/27/19at 21:54; Admin Dose 125 MLS/HR;  Start 6/26/19 at 08:30


Ibuprofen (Motrin) 600 mg ONCE PRN PO .PAIN 1-5;  Start 6/26/19 at 08:30


Oxycodone/Aspirin (Percodan) 2 tab ONCE PRN PO .PAIN 6-10;  Start 6/26/19 at 


08:30


Oxytocin/Lactated Ringer's 500 ml @ 0 mls/hr ONCE PRN IV .VAGINAL BLEEDING;  


Start 6/26/19 at 08:30


Methylergonovine Maleate (Methergine) 0.2 mg ONCE PRN IM .VAGINAL BLEEDING;  


Start 6/26/19 at 08:30


Carboprost Tromethamine (Hemabate) 250 mcg ONCE PRN IM .VAGINAL BLEEDING;  Start


6/26/19 at 08:30


Misoprostol (Cytotec) 1,000 mcg ONCE PRN ID .VAGINAL BLEEDING;  Start 6/26/19 at


08:30


Oxytocin/Lactated Ringer's 500 ml @ 0 mls/hr Q0M IV  Last administered on 


6/27/19at 13:00; Admin Dose 2 MLS/HR;  Start 6/26/19 at 17:00


Naloxone HCl (Narcan) 0.2 mg Q2M  PRN IV .RESP RATE;  Start 6/26/19 at 18:00


Fentanyl/ Ropivacaine 100 ml EPIDURAL (PCEA) EPI  Last administered on 6/27/19at


14:57; Admin Dose 100 ML;  Start 6/26/19 at 18:00


Ondansetron HCl (Zofran Inj) 4 mg Q6H  PRN IV NAUSEA AND/OR VOMITING Last 


administered on 6/27/19at 18:38; Admin Dose 4 MG;  Start 6/27/19 at 18:30


Cefazolin Sodium/ Dextrose 50 ml @  100 mls/hr ONCE IVPB ;  Start 6/27/19 at 


20:30


Naloxone HCl (Narcan) 0.1 mg Q2M  PRN IV .RESP RATE;  Start 6/27/19 at 21:30;  


Stop 6/28/19 at 21:29


Ketorolac Tromethamine (Toradol) 30 mg Q6H  PRN IV PAIN AFTER CSECTION Last 


administered on 6/28/19at 12:15; Admin Dose 30 MG;  Start 6/27/19 at 21:30;  


Stop 6/28/19 at 21:29


Morphine Sulfate (morphine) 3 mg Q4  PRN IV .BREAKTHROUGH PAIN;  Start 6/27/19 


at 21:30;  Stop 6/28/19 at 21:29


Morphine Sulfate (morphine) 2 mg Q3H  PRN IV .PAIN 1-5;  Start 6/27/19 at 21:30;


 Stop 6/28/19 at 21:29


Morphine Sulfate (morphine) 4 mg Q3H  PRN IV .PAIN 6-10;  Start 6/27/19 at 


21:30;  Stop 6/28/19 at 21:29


Diphenhydramine HCl (Benadryl) 25 mg Q6H  PRN IV .ITCHING;  Start 6/27/19 at 


21:30;  Stop 6/28/19 at 21:29


Ondansetron HCl (Zofran Inj) 4 mg Q6H  PRN IV .NAUSEA/VOMITING;  Start 6/27/19 


at 21:30;  Stop 6/28/19 at 21:29


Methylergonovine Maleate (Methergine) 0.2 mg Q6H  PRN PO .VAGINAL BLEEDING;  


Start 6/27/19 at 22:00


Simethicone (Mylicon) 160 mg Q8H  PRN PO .GAS;  Start 6/27/19 at 22:00


Senna/Docusate Sodium (Senokot-S) 1 tab BID PO  Last administered on 6/28/19at 


08:50; Admin Dose 1 TAB;  Start 6/28/19 at 09:00


Lanolin (Lanolin Hpa) 1 applic BEDSIDE MEDICATION  PRN TOP .NIPPLES Last 


administered on 6/28/19at 08:50; Admin Dose 1 APPLIC;  Start 6/27/19 at 22:00


Diphtheria/ Tetanus/Acell Pertussis (Adacel) 0.5 ml ONCE ONCE IM* ;  Start 


6/30/19 at 09:00;  Stop 6/30/19 at 09:01


Measles/Mumps/ Rubella Vaccine Live (Mmr Ii Vaccine) 0.5 ml ONCE ONCE SC* ;  


Start 6/30/19 at 09:00;  Stop 6/30/19 at 09:01


Oxytocin/Lactated Ringer's 500 ml @ 0 mls/hr ONCE PRN IV .VAGINAL BLEEDING;  


Start 6/27/19 at 22:00


Methylergonovine Maleate (Methergine) 0.2 mg ONCE PRN IM .VAGINAL BLEEDING;  


Start 6/27/19 at 22:00


Carboprost Tromethamine (Hemabate) 250 mcg ONCE PRN IM .VAGINAL BLEEDING;  Start


6/27/19 at 22:00


Misoprostol (Cytotec) 1,000 mcg ONCE PRN ID .VAGINAL BLEEDING;  Start 6/27/19 at


22:00


Ibuprofen (Motrin) 800 mg Q6H  PRN PO MILD PAIN LEVEL 1-3;  Start 6/28/19 at 


21:30


Acetaminophen/ Hydrocodone Bitart (Norco (5/325)) 1 tab Q4H  PRN PO MODERATE 


PAIN LEVEL 4-6;  Start 6/28/19 at 21:30


Acetaminophen/ Hydrocodone Bitart (Norco (5/325)) 2 tab Q4H  PRN PO SEVERE PAIN 


LEVEL 7-10;  Start 6/28/19 at 21:30











WILL PRITCHETT MD             Jun 28, 2019 15:07

## 2019-06-28 NOTE — OPPN
Date/Time of Note


Date/Time of Note


DATE: 6/28/19 


TIME: 09:36





Anesthesia Follow up


Anesthesia Follow up


Last documented vital signs





Vital Signs


  Date      Temp  Pulse  Resp  B/P (MAP)   Pulse Ox  O2          O2 Flow    FiO2


Time                                                 Delivery    Rate


   6/28/19  98.8     83    18      113/57        97  Room Air


     04:30                           (75)





Respiratory function:  WNL


Cardiovascular function:  WNL


Comments


A 34 year s/p duramorph for post op Pain POD#1 is fine . no pain, itching, n/v, 


headache.











PRUDENCIO KLINE MD            Jun 28, 2019 09:37

## 2019-06-28 NOTE — PAC
Date/Time of Note


Date/Time of Note


DATE: 6/28/19 


TIME: 09:36





Post-Anesthesia Notes


Post-Anesthesia Note


Last documented vital signs





Vital Signs


  Date      Temp  Pulse  Resp  B/P (MAP)   Pulse Ox  O2          O2 Flow    FiO2


Time                                                 Delivery    Rate


   6/28/19  98.8     83    18      113/57        97  Room Air


     04:30                           (75)





Activity:  WNL


Respiratory function:  WNL


Cardiovascular function:  WNL


Mental status:  Baseline


Pain reasonably controlled:  Yes


Hydration appropriate:  Yes


Nausea/Vomiting absent:  No











PRUDENCIO KLINE MD            Jun 28, 2019 09:36

## 2019-06-29 VITALS — RESPIRATION RATE: 18 BRPM | SYSTOLIC BLOOD PRESSURE: 97 MMHG | DIASTOLIC BLOOD PRESSURE: 51 MMHG | HEART RATE: 70 BPM

## 2019-06-29 VITALS — HEART RATE: 70 BPM | RESPIRATION RATE: 18 BRPM | SYSTOLIC BLOOD PRESSURE: 118 MMHG | DIASTOLIC BLOOD PRESSURE: 70 MMHG

## 2019-06-29 VITALS — HEART RATE: 69 BPM | RESPIRATION RATE: 18 BRPM | DIASTOLIC BLOOD PRESSURE: 52 MMHG | SYSTOLIC BLOOD PRESSURE: 110 MMHG

## 2019-06-29 VITALS — HEART RATE: 71 BPM | SYSTOLIC BLOOD PRESSURE: 100 MMHG | RESPIRATION RATE: 18 BRPM | DIASTOLIC BLOOD PRESSURE: 52 MMHG

## 2019-06-29 RX ADMIN — IBUPROFEN 1 MG: 800 TABLET, FILM COATED ORAL at 22:45

## 2019-06-29 RX ADMIN — Medication 1 MG: at 15:32

## 2019-06-29 RX ADMIN — DOCUSATE SODIUM AND SENNOSIDES 1 TAB: 8.6; 5 TABLET, FILM COATED ORAL at 08:59

## 2019-06-29 RX ADMIN — MAGNESIUM HYDROXIDE 1 ML: 400 SUSPENSION ORAL at 15:32

## 2019-06-29 RX ADMIN — IBUPROFEN 1 MG: 600 TABLET ORAL at 12:34

## 2019-06-29 RX ADMIN — HYDROCODONE BITARTRATE AND ACETAMINOPHEN 1 TAB: 5; 325 TABLET ORAL at 09:25

## 2019-06-29 RX ADMIN — DOCUSATE SODIUM AND SENNOSIDES SCH TAB: 8.6; 5 TABLET, FILM COATED ORAL at 09:27

## 2019-06-29 RX ADMIN — DOCUSATE SODIUM AND SENNOSIDES 1 TAB: 8.6; 5 TABLET, FILM COATED ORAL at 09:27

## 2019-06-29 RX ADMIN — IBUPROFEN 1 MG: 800 TABLET, FILM COATED ORAL at 17:51

## 2019-06-29 RX ADMIN — HYDROCODONE BITARTRATE AND ACETAMINOPHEN PRN TAB: 5; 325 TABLET ORAL at 09:25

## 2019-06-29 RX ADMIN — HYDROCODONE BITARTRATE AND ACETAMINOPHEN PRN TAB: 5; 325 TABLET ORAL at 05:48

## 2019-06-29 RX ADMIN — IBUPROFEN PRN MG: 800 TABLET ORAL at 17:51

## 2019-06-29 RX ADMIN — HYDROCODONE BITARTRATE AND ACETAMINOPHEN 1 TAB: 5; 325 TABLET ORAL at 05:48

## 2019-06-29 RX ADMIN — DOCUSATE SODIUM AND SENNOSIDES SCH TAB: 8.6; 5 TABLET, FILM COATED ORAL at 08:59

## 2019-06-29 RX ADMIN — DOCUSATE SODIUM AND SENNOSIDES SCH TAB: 8.6; 5 TABLET, FILM COATED ORAL at 20:59

## 2019-06-29 RX ADMIN — BISACODYL 1 MG: 5 TABLET, COATED ORAL at 15:23

## 2019-06-29 RX ADMIN — IBUPROFEN PRN MG: 800 TABLET ORAL at 22:45

## 2019-06-29 RX ADMIN — DOCUSATE SODIUM AND SENNOSIDES 1 TAB: 8.6; 5 TABLET, FILM COATED ORAL at 20:59

## 2019-06-29 NOTE — OPR
DATE OF OPERATION:  2019

 

 

PREOPERATIVE DIAGNOSES:  

1.  40 and 1/7 weeks intrauterine pregnancy in labor.

2.  Failure to progress.

3.  Suspected macrosomia.

 

POSTOPERATIVE DIAGNOSES:

1.  40 and 1/7 weeks intrauterine pregnancy in labor.

2.  Failure to progress.

3.  Suspected macrosomia.

 

OPERATION PERFORMED:  Primary low transverse  section.

 

SURGEON:  Will Pritchett MD

 

ASSISTANT:  Maikel Holden MD

 

ANESTHESIA:  Epidural.

 

ANESTHESIOLOGIST:  Dr. Stewart.  

 

PROCEDURE:  Primary low transverse  section.

 

OPERATIVE TECHNIQUE:  Under epidural anesthesia, the patient was prepped and draped in the usual [facility]
ion for abdominal surgery.  After checking for the effect of the anesthesia, a Pfannenstiel incision,
 10 cm skin incision was performed.  The incision was carried from the skin up to the fascia.  Upon o
pening the skin up to the fascia, small blood vessels were noted to be oozing and these were all caut
erized.  Fascia was opened transversely followed by splitting the muscles vertically and the peritone
um vertically.  Upon opening the abdominal cavity, the bladder blade was put in place.  A small nick 
was performed from the serosa up to the endometrium and the lower uterine segment and the nick was ca
rried sideways with the aid of my 2 fingers.  My left hand was inserted on the lower segment of the u
terus and the baby's head was delivered.  2+ caput was noted.  Baby's airways were quickly suctioned 
with amniotic fluid.  There was 1 loop of tight cord around the baby's neck that needs to be released
 prior to the delivery of the rest of the body of the baby.  Baby's cord was clamped after 30 seconds
.  The baby was handed to the nursery nurse and to the respiratory tech.  The placenta was delivered 
manually and complete.  The uterus was exteriorized.  The uterus was cleansed with wet lap sponge to 
make sure that no fetal membranes were left behind.  After correct sponge count, the uterus was close
d in the usual fashion using #1 chromic for the first layer, continuous locking suture was used follo
wed by #1 chromic for the second layer, imbricating sutures were used.  Bleeders were checked, and th
ere was no bleeding noted.  After checking for any bleeders in which there were none, both tubes and 
ovaries were inspected.  They were healthy looking.  The back of the uterus was checked for any hemat
jacob and there were none noted.  Then, once again the uterus was checked.  The uterus was hypotonic an
d Methergine was given, and the uterus contracted.  The uterus was put back to the pelvic cavity.  On
ce again, uterine incision was checked for any bleeders and there was no bleeding noted.  After corre
ct sponge count, needle count and instrument count as confirmed by the scrub tech and circulator, the
 abdomen was closed in the usual fashion using 0 Vicryl for the peritoneum, 0 Vicryl for the muscles,
 for the fascia 0 Vicryl continuous stitch was used followed by few figure-of-eight suture for the pedroza
bcutaneous tissue, it was closed with 3-0 Vicryl and the skin was closed with 3-0 Vicryl, subcuticula
r suture was used.  The patient tolerated the procedure well.  Estimated blood loss about 700 mL.  Vi
cheyenne signs were stable during and after the procedure.  She delivered a healthy baby girl, Apgar 9 and
 9 on 2019 weighing 6 pounds 11 ounces, 3030 grams, 19 inches long.

 

 

Dictated By: WILL PRITCHETT MD

 

NS/NTS

DD:    2019 05:05:18

DT:    2019 07:45:12

Conf#: [id number]

DID#:  [id number]

CC: MAIKEL HOLDEN MD;*EndCC*

## 2019-06-30 VITALS — HEART RATE: 64 BPM | SYSTOLIC BLOOD PRESSURE: 105 MMHG | DIASTOLIC BLOOD PRESSURE: 52 MMHG | RESPIRATION RATE: 18 BRPM

## 2019-06-30 VITALS — SYSTOLIC BLOOD PRESSURE: 119 MMHG | RESPIRATION RATE: 18 BRPM | DIASTOLIC BLOOD PRESSURE: 58 MMHG | HEART RATE: 79 BPM

## 2019-06-30 LAB
ADD MAN DIFF?: NO
BASOPHIL #: 0.1 10^3/UL (ref 0–0.1)
BASOPHILS %: 0.5 % (ref 0–2)
EOSINOPHILS #: 0.5 10^3/UL (ref 0–0.5)
EOSINOPHILS %: 4.6 % (ref 0–7)
HEMATOCRIT: 31.1 % (ref 37–47)
HEMOGLOBIN: 9.9 G/DL (ref 12–16)
IMMATURE GRANS #M: 0.06 10^3/UL (ref 0–0.03)
IMMATURE GRANS % (M): 0.5 % (ref 0–0.43)
LYMPHOCYTES #: 1.3 10^3/UL (ref 0.8–2.9)
LYMPHOCYTES %: 12 % (ref 15–51)
MEAN CORPUSCULAR HEMOGLOBIN: 28.7 PG (ref 29–33)
MEAN CORPUSCULAR HGB CONC: 31.8 G/DL (ref 32–37)
MEAN CORPUSCULAR VOLUME: 90.1 FL (ref 82–101)
MEAN PLATELET VOLUME: 12 FL (ref 7.4–10.4)
MONOCYTE #: 1.1 10^3/UL (ref 0.3–0.9)
MONOCYTES %: 10.2 % (ref 0–11)
NEUTROPHIL #: 7.9 10^3/UL (ref 1.6–7.5)
NEUTROPHILS %: 72.2 % (ref 39–77)
NUCLEATED RED BLOOD CELLS #: 0 10^3/UL (ref 0–0)
NUCLEATED RED BLOOD CELLS%: 0 /100WBC (ref 0–0)
PLATELET COUNT: 176 10^3/UL (ref 140–415)
RED BLOOD COUNT: 3.45 10^6/UL (ref 4.2–5.4)
RED CELL DISTRIBUTION WIDTH: 17.2 % (ref 11.5–14.5)
WHITE BLOOD COUNT: 10.9 10^3/UL (ref 4.8–10.8)

## 2019-06-30 RX ADMIN — MEASLES, MUMPS, AND RUBELLA VIRUS VACCINE LIVE 1 ML: 1000; 12500; 1000 INJECTION, POWDER, LYOPHILIZED, FOR SUSPENSION SUBCUTANEOUS at 09:00

## 2019-06-30 RX ADMIN — CLOSTRIDIUM TETANI TOXOID ANTIGEN (FORMALDEHYDE INACTIVATED), CORYNEBACTERIUM DIPHTHERIAE TOXOID ANTIGEN (FORMALDEHYDE INACTIVATED), BORDETELLA PERTUSSIS TOXOID ANTIGEN (GLUTARALDEHYDE INACTIVATED), BORDETELLA PERTUSSIS FILAMENTOUS HEMAGGLUTININ ANTIGEN (FORMALDEHYDE INACTIVATED), BORDETELLA PERTUSSIS PERTACTIN ANTIGEN, AND BORDETELLA PERTUSSIS FIMBRIAE 2/3 ANTIGEN 1 ML: 5; 2; 2.5; 5; 3; 5 INJECTION, SUSPENSION INTRAMUSCULAR at 09:00

## 2019-06-30 RX ADMIN — IBUPROFEN PRN MG: 800 TABLET ORAL at 03:58

## 2019-06-30 RX ADMIN — IBUPROFEN 1 MG: 800 TABLET, FILM COATED ORAL at 03:58

## 2019-06-30 RX ADMIN — HYDROCODONE BITARTRATE AND ACETAMINOPHEN 1 TAB: 5; 325 TABLET ORAL at 04:57

## 2019-06-30 RX ADMIN — HYDROCODONE BITARTRATE AND ACETAMINOPHEN PRN TAB: 5; 325 TABLET ORAL at 11:06

## 2019-06-30 RX ADMIN — HYDROCODONE BITARTRATE AND ACETAMINOPHEN PRN TAB: 5; 325 TABLET ORAL at 04:57

## 2019-06-30 RX ADMIN — HYDROCODONE BITARTRATE AND ACETAMINOPHEN 1 TAB: 5; 325 TABLET ORAL at 11:06

## 2019-06-30 NOTE — PN
Date/Time of Note


Date/Time of Note


DATE: 6/29/19 


TIME: 03:00PM





Assessment/Plan


VTE Prophylaxis


Risk score (from Nsg)>0 risk:  1


SCD applied (from Nsg):  No


SCD contraindicated:  low risk/ambulating


Pharmacological prophylaxis:  NA/contraindicated


Pharm contraindication:  low risk/ambulating





Lines/Catheters


IV Catheter Type (from Nrsg):  Peripheral IV





Assessment/Plan


Assessment/Plan


POST CSECTION DAY 2


HOME TOMORROW 


CBC TOMORROW


COUNSELED


INSTRUCTED


PRESCRIPTION GIVEN FOR PAIN


RETURN TO CLINIC IN 2 WEEKS


CALL OFFICE IF THERE IS ANY PROBLEM OR CONCERN


CONTINUE WITH VITAMINS OD AND FERROUS SULFATE 325MG PO TID


DIET AS ADVISED


Result Diagram:  


6/28/19 0755                                                                    


           6/28/19 0755








Subjective


24 Hr Interval Summary


Free Text/Dictation


POST CSECTION DAY 2


LITTLE BOWEL MOVEMENT


GOOD URINE OUTPUT


FEELS LESS INCISIONAL PAINS





Exam/Review of Systems


Exam


Vitals





Vital Signs


  Date      Temp  Pulse  Resp  B/P (MAP)   Pulse Ox  O2          O2 Flow    FiO2


Time                                                 Delivery    Rate


   6/30/19  98.1     79    18      119/58            Room Air


     04:00                           (78)


   6/28/19                                       95


     20:00





Exam


VITAL SIGNS STABLE: YES


AFEBRILE: YES


BREAST NOT ENGORGED, NON-TENDER, NO APPRECIABLE MASS: YES


LUNGS CLEAR, NO RALES, WHEEZES, RHONCHI: YES


SINUS RHYTHM WITHOUT MURMUR: YES


ABDOMEN: NON-TENDER


FUNDUS: BELOW UMBILICUS


BOWEL SOUNDS: PRESENT


UTERUS: FIRM


INCISION (CLEAN, DRY, AND INTACT): YES


LOCHIA: LIGHT


DEEP TENDON REFLEXES: 0


EXTREMITIES: NO CALF TENDERNESS


EDEMA SCALE: NONE





Medications


Medication





Current Medications


Butorphanol Tartrate (Stadol) 2 mg Q2H  PRN IV .PAIN SCALE 6-10;  Start 6/26/19 


at 08:30


Oxytocin/Lactated Ringer's 500 ml @  500 mls/hr ONCE POST PARTUM IV ;  Start 


6/26/19 at 08:30


Oxytocin/Lactated Ringer's 500 ml @  125 mls/hr POST PARTUM IV  Last 


administered on 6/27/19at 21:54; Admin Dose 125 MLS/HR;  Start 6/26/19 at 08:30


Oxycodone/Aspirin (Percodan) 2 tab ONCE PRN PO .PAIN 6-10;  Start 6/26/19 at 


08:30


Oxytocin/Lactated Ringer's 500 ml @ 0 mls/hr ONCE PRN IV .VAGINAL BLEEDING;  


Start 6/26/19 at 08:30


Methylergonovine Maleate (Methergine) 0.2 mg ONCE PRN IM .VAGINAL BLEEDING;  


Start 6/26/19 at 08:30


Carboprost Tromethamine (Hemabate) 250 mcg ONCE PRN IM .VAGINAL BLEEDING;  Start


6/26/19 at 08:30


Misoprostol (Cytotec) 1,000 mcg ONCE PRN LA .VAGINAL BLEEDING;  Start 6/26/19 at


08:30


Oxytocin/Lactated Ringer's 500 ml @ 0 mls/hr Q0M IV  Last administered on 


6/27/19at 13:00; Admin Dose 2 MLS/HR;  Start 6/26/19 at 17:00


Naloxone HCl (Narcan) 0.2 mg Q2M  PRN IV .RESP RATE;  Start 6/26/19 at 18:00


Fentanyl/ Ropivacaine 100 ml EPIDURAL (PCEA) EPI  Last administered on 6/27/19at


14:57; Admin Dose 100 ML;  Start 6/26/19 at 18:00


Ondansetron HCl (Zofran Inj) 4 mg Q6H  PRN IV NAUSEA AND/OR VOMITING Last adm


inistered on 6/27/19at 18:38; Admin Dose 4 MG;  Start 6/27/19 at 18:30


Cefazolin Sodium/ Dextrose 50 ml @  100 mls/hr ONCE IVPB ;  Start 6/27/19 at 


20:30


Methylergonovine Maleate (Methergine) 0.2 mg Q6H  PRN PO .VAGINAL BLEEDING;  


Start 6/27/19 at 22:00


Simethicone (Mylicon) 160 mg Q8H  PRN PO .GAS;  Start 6/27/19 at 22:00


Senna/Docusate Sodium (Senokot-S) 1 tab BID PO  Last administered on 6/29/19at 


20:59; Admin Dose 1 TAB;  Start 6/28/19 at 09:00


Lanolin (Lanolin Hpa) 1 applic BEDSIDE MEDICATION  PRN TOP .NIPPLES Last 


administered on 6/28/19at 08:50; Admin Dose 1 APPLIC;  Start 6/27/19 at 22:00


Diphtheria/ Tetanus/Acell Pertussis (Adacel) 0.5 ml ONCE ONCE IM* ;  Start 


6/30/19 at 09:00;  Stop 6/30/19 at 09:01


Measles/Mumps/ Rubella Vaccine Live (Mmr Ii Vaccine) 0.5 ml ONCE ONCE SC* ;  Sta


rt 6/30/19 at 09:00;  Stop 6/30/19 at 09:01


Oxytocin/Lactated Ringer's 500 ml @ 0 mls/hr ONCE PRN IV .VAGINAL BLEEDING;  


Start 6/27/19 at 22:00


Methylergonovine Maleate (Methergine) 0.2 mg ONCE PRN IM .VAGINAL BLEEDING;  


Start 6/27/19 at 22:00


Carboprost Tromethamine (Hemabate) 250 mcg ONCE PRN IM .VAGINAL BLEEDING;  Start


6/27/19 at 22:00


Misoprostol (Cytotec) 1,000 mcg ONCE PRN LA .VAGINAL BLEEDING;  Start 6/27/19 at


22:00


Ibuprofen (Motrin) 800 mg Q6H  PRN PO MILD PAIN LEVEL 1-3 Last administered on 


6/30/19at 03:58; Admin Dose 800 MG;  Start 6/28/19 at 21:30


Acetaminophen/ Hydrocodone Bitart (Norco (5/325)) 1 tab Q4H  PRN PO MODERATE 


PAIN LEVEL 4-6 Last administered on 6/30/19at 04:57; Admin Dose 1 TAB;  Start 


6/28/19 at 21:30


Acetaminophen/ Hydrocodone Bitart (Norco (5/325)) 2 tab Q4H  PRN PO SEVERE PAIN 


LEVEL 7-10 Last administered on 6/29/19at 09:25; Admin Dose 2 TAB;  Start 6/28/1


9 at 21:30











WILL PRITCHETT MD             Jun 30, 2019 06:21

## 2019-07-01 NOTE — DELSUM
===================================

Delivery Summary A-C

===================================

Datetime Report Generated by CPN: 2019 16:19

   

   

===================================

DELIVERY PERSONNEL

===================================

   

Circulator:  KERVIN, THANH

   

===================================

MATERNAL INFORMATION

===================================

   

Delivery Anesthesia:  Epidural

Medications in Delivery:  SEE ANESTHESIA RECORD

Delivery QBL (ml):  700

Placenta Cultured:  No

Maternal Complications:  Other

   

===================================

LABOR SUMMARY

===================================

   

EDC:  2019 00:00

No. Babies in Womb:  1

 Attempted:  No

Labor Anesthesia:  Epidural

   

===================================

LABOR INFORMATION

===================================

   

Reason for Induction:  Postterm

Onset of Labor:  2019 04:57

Cervical Ripening Agents:  Cytotec @ 50

Group B Beta Strep:  Negative

Antibiotics # of Doses:  1

Antibiotics Time of Last Dose:  2019 20:38

Steroids Given:  None

Reason Steroids Not Administered:  Not Applicable

   

===================================

MEMBRANES

===================================

   

Membranes Rupture Method:  Artificial

Rupture of Membranes:  2019 08:34

Length of Rupture (hr):  12.27

Amniotic Fluid Color:  Clear

Amniotic Fluid Amount:  Moderate

Amniotic Fluid Odor:  None

   

===================================

STAGES OF LABOR

===================================

   

Stage 3 hr:  0

Stage 3 min:  1

Total Time in Labor hr:  15

Total Time in Labor min:  54

   

===================================

CSECTION DELIVERY

===================================

   

Primary Indication:  Failure of Descent

Secondary Indication:  Failed Induction

CSection Urgency:  Elective

Labor:  Labor

CSection Incision:  Lower Uterine Transverse

   

===================================

BABY A INFORMATION

===================================

   

Infant Delivery Date/Time:  2019 20:50

Method of Delivery:  

Born in Route :  No

:  N/A

Forceps:  N/A

Vacuum Extraction:  N/A

Shoulder Dystocia :  N/A

   

===================================

SHOULDER DYSTOCIA BABY A

===================================

   

Infant Delivery Date/Time:  2019 20:50

   

===================================

PRESENTATION/POSITION BABY A

===================================

   

Presentation:  Cephalic

Cephalic Presentation:  Vertex

Breech Presentation:  N/A

   

===================================

PLACENTA INFORMATION BABY A

===================================

   

Placenta Delivery Time :  2019 20:51

Placenta Method of Delivery:  Manual Removal

Placenta Status:  Delivered

   

===================================

APGAR SCORES BABY A

===================================

   

Heart Rate 1 min:  >100 bpm

Resp Effort 1 min:  Good Cry

Reflex Irritability 1 min:  Cough/Sneeze/Pulls Away

Muscle Tone 1 min:  Active Motion

Color 1 min:  Body Pink, Extremit Blue

Resuscitation Effort 1 min:  Tactile Stimulation

APGAR SCORE 1 MIN:  9

Heart Rate 5 min:  >100 bpm

Resp Effort 5 min:  Good Cry

Reflex Irritability 5 min:  Cough/Sneeze/Pulls Away

Muscle Tone 5 min:  Active Motion

Color 5 min:  Body Pink, Extremit Blue

Resuscitation Effort 5 min:  Tactile Stimulation

APGAR SCORE 5 MIN:  9

   

===================================

INFANT INFORMATION BABY A

===================================

   

Gestational Age at Delivery:  40.1

Gestational Status:  Full Term- 39- 40.6 Weeks

Infant Outcome :  Liveborn, with signs of life

Infant Condition :  Stable

Infant Sex:  Female

   

===================================

IDENTIFICATION/MEDS BABY A

===================================

   

ID Band Number:  [id number]

ID Band Location:  Right Leg; Left Arm



Sensor Applied:  Yes

Sensor Number:  [id number]

Sensor Location :  Cord Clamp

Vitamin K Given :  Not Given

Erythromycin Given:  Not Given

   

===================================

WEIGHT/LENGTH BABY A

===================================

   

Infant Birthweight (gm):  3030

Infant Weight (lb):  6

Infant Weight (oz):  11

Infant Length (in):  19.00

Infant Length (cm):  48.26

   

===================================

CORD INFORMATION BABY A

===================================

   

No. Cord Vessels:  3

Nuchal Cord :  Around Neck x1, Loose

Cord Blood Taken:  Yes

Infant Suction:  Mouth; Nose

   

===================================

ASSESSMENT BABY A

===================================

   

Infant Complications:  None

Physical Findings at Delivery:  Within Normal Limits

Infant Respirations:  Appears Normal

Neonatologist/ALS Called :  Yes

Infant Care By:  RT/RN

Transferred To:  Remains with Mother